# Patient Record
Sex: MALE | URBAN - METROPOLITAN AREA
[De-identification: names, ages, dates, MRNs, and addresses within clinical notes are randomized per-mention and may not be internally consistent; named-entity substitution may affect disease eponyms.]

---

## 2022-11-03 ENCOUNTER — APPOINTMENT (OUTPATIENT)
Dept: URBAN - METROPOLITAN AREA CLINIC 259 | Age: 57
Setting detail: DERMATOLOGY
End: 2022-11-08

## 2022-11-03 DIAGNOSIS — D22 MELANOCYTIC NEVI: ICD-10-CM

## 2022-11-03 DIAGNOSIS — L81.4 OTHER MELANIN HYPERPIGMENTATION: ICD-10-CM

## 2022-11-03 DIAGNOSIS — D18.0 HEMANGIOMA: ICD-10-CM

## 2022-11-03 DIAGNOSIS — L57.8 OTHER SKIN CHANGES DUE TO CHRONIC EXPOSURE TO NONIONIZING RADIATION: ICD-10-CM

## 2022-11-03 DIAGNOSIS — L20.89 OTHER ATOPIC DERMATITIS: ICD-10-CM

## 2022-11-03 DIAGNOSIS — L21.8 OTHER SEBORRHEIC DERMATITIS: ICD-10-CM

## 2022-11-03 DIAGNOSIS — L82.1 OTHER SEBORRHEIC KERATOSIS: ICD-10-CM

## 2022-11-03 DIAGNOSIS — Z71.89 OTHER SPECIFIED COUNSELING: ICD-10-CM

## 2022-11-03 DIAGNOSIS — L57.0 ACTINIC KERATOSIS: ICD-10-CM

## 2022-11-03 PROBLEM — D18.01 HEMANGIOMA OF SKIN AND SUBCUTANEOUS TISSUE: Status: ACTIVE | Noted: 2022-11-03

## 2022-11-03 PROBLEM — D22.5 MELANOCYTIC NEVI OF TRUNK: Status: ACTIVE | Noted: 2022-11-03

## 2022-11-03 PROBLEM — L20.84 INTRINSIC (ALLERGIC) ECZEMA: Status: ACTIVE | Noted: 2022-11-03

## 2022-11-03 PROCEDURE — OTHER PRESCRIPTION: OTHER

## 2022-11-03 PROCEDURE — OTHER LIQUID NITROGEN: OTHER

## 2022-11-03 PROCEDURE — 99203 OFFICE O/P NEW LOW 30 MIN: CPT | Mod: 25

## 2022-11-03 PROCEDURE — 17003 DESTRUCT PREMALG LES 2-14: CPT

## 2022-11-03 PROCEDURE — 17000 DESTRUCT PREMALG LESION: CPT

## 2022-11-03 PROCEDURE — OTHER MIPS QUALITY: OTHER

## 2022-11-03 PROCEDURE — OTHER COUNSELING: OTHER

## 2022-11-03 RX ORDER — FLUOCINONIDE 0.5 MG/G
CREAM TOPICAL
Qty: 1 | Refills: 3 | Status: ERX | COMMUNITY
Start: 2022-11-03

## 2022-11-03 RX ORDER — HYDROCORTISONE 25 MG/G
OINTMENT TOPICAL
Qty: 1 | Refills: 3 | Status: ERX | COMMUNITY
Start: 2022-11-03

## 2022-11-03 ASSESSMENT — LOCATION ZONE DERM
LOCATION ZONE: SCALP
LOCATION ZONE: EYELID
LOCATION ZONE: TRUNK

## 2022-11-03 ASSESSMENT — LOCATION SIMPLE DESCRIPTION DERM
LOCATION SIMPLE: RIGHT SUPERIOR EYELID
LOCATION SIMPLE: UPPER BACK
LOCATION SIMPLE: ANTERIOR SCALP
LOCATION SIMPLE: LEFT SUPERIOR EYELID
LOCATION SIMPLE: LEFT UPPER BACK
LOCATION SIMPLE: RIGHT UPPER BACK

## 2022-11-03 ASSESSMENT — LOCATION DETAILED DESCRIPTION DERM
LOCATION DETAILED: INFERIOR THORACIC SPINE
LOCATION DETAILED: LEFT INFERIOR MEDIAL UPPER BACK
LOCATION DETAILED: LEFT LATERAL SUPERIOR EYELID
LOCATION DETAILED: RIGHT LATERAL SUPERIOR EYELID
LOCATION DETAILED: RIGHT SUPERIOR MEDIAL UPPER BACK
LOCATION DETAILED: MID-FRONTAL SCALP
LOCATION DETAILED: LEFT MEDIAL UPPER BACK

## 2022-11-03 NOTE — PROCEDURE: MIPS QUALITY

## 2022-11-03 NOTE — HPI: FULL BODY SKIN EXAMINATION
How Severe Are Your Spot(S)?: moderate
What Type Of Note Output Would You Prefer (Optional)?: Standard Output
What Is The Reason For Today's Visit?: Full Body Skin Examination
What Is The Reason For Today's Visit? (Being Monitored For X): concerning skin lesions on an annual basis
Additional History: Patient has multiple concerns today. He states he gets itchiness on his right eyelid and legs, and wants his scalp looked at too, as the areas are bothersome. Otherwise, he is here for his full body skin examination.

## 2024-06-06 ENCOUNTER — TRANSCRIBE ORDERS (OUTPATIENT)
Dept: OTHER | Age: 59
End: 2024-06-06

## 2024-06-06 DIAGNOSIS — M25.561 RIGHT KNEE PAIN, UNSPECIFIED CHRONICITY: Primary | ICD-10-CM

## 2024-06-27 ENCOUNTER — THERAPY VISIT (OUTPATIENT)
Dept: PHYSICAL THERAPY | Facility: CLINIC | Age: 59
End: 2024-06-27
Attending: FAMILY MEDICINE
Payer: COMMERCIAL

## 2024-06-27 DIAGNOSIS — M25.561 RIGHT KNEE PAIN, UNSPECIFIED CHRONICITY: ICD-10-CM

## 2024-06-27 PROCEDURE — 97110 THERAPEUTIC EXERCISES: CPT | Mod: GP | Performed by: PHYSICAL THERAPIST

## 2024-06-27 PROCEDURE — 97161 PT EVAL LOW COMPLEX 20 MIN: CPT | Mod: GP | Performed by: PHYSICAL THERAPIST

## 2024-06-27 ASSESSMENT — ACTIVITIES OF DAILY LIVING (ADL)
PLEASE_INDICATE_YOR_PRIMARY_REASON_FOR_REFERRAL_TO_THERAPY:: KNEE
SQUAT: ACTIVITY IS MINIMALLY DIFFICULT
PAIN: THE SYMPTOM AFFECTS MY ACTIVITY MODERATELY
WEAKNESS: I DO NOT HAVE THE SYMPTOM
KNEEL ON THE FRONT OF YOUR KNEE: ACTIVITY IS NOT DIFFICULT
WEAKNESS: I DO NOT HAVE THE SYMPTOM
PAIN: THE SYMPTOM AFFECTS MY ACTIVITY MODERATELY
LIMPING: I DO NOT HAVE THE SYMPTOM
AS_A_RESULT_OF_YOUR_KNEE_INJURY,_HOW_WOULD_YOU_RATE_YOUR_CURRENT_LEVEL_OF_DAILY_ACTIVITY?: NEARLY NORMAL
WALK: ACTIVITY IS MINIMALLY DIFFICULT
GIVING WAY, BUCKLING OR SHIFTING OF KNEE: I DO NOT HAVE THE SYMPTOM
WALK: ACTIVITY IS MINIMALLY DIFFICULT
STAND: ACTIVITY IS NOT DIFFICULT
GO DOWN STAIRS: ACTIVITY IS MINIMALLY DIFFICULT
HOW_WOULD_YOU_RATE_THE_OVERALL_FUNCTION_OF_YOUR_KNEE_DURING_YOUR_USUAL_DAILY_ACTIVITIES?: NEARLY NORMAL
STIFFNESS: THE SYMPTOM AFFECTS MY ACTIVITY MODERATELY
LIMPING: I DO NOT HAVE THE SYMPTOM
SQUAT: ACTIVITY IS MINIMALLY DIFFICULT
KNEE_ACTIVITY_OF_DAILY_LIVING_SCORE: 81.43
SWELLING: I HAVE THE SYMPTOM BUT IT DOES NOT AFFECT MY ACTIVITY
GO UP STAIRS: ACTIVITY IS SOMEWHAT DIFFICULT
STIFFNESS: THE SYMPTOM AFFECTS MY ACTIVITY MODERATELY
STAND: ACTIVITY IS NOT DIFFICULT
SWELLING: I HAVE THE SYMPTOM BUT IT DOES NOT AFFECT MY ACTIVITY
RAW_SCORE: 57
SIT WITH YOUR KNEE BENT: ACTIVITY IS NOT DIFFICULT
GO UP STAIRS: ACTIVITY IS SOMEWHAT DIFFICULT
HOW_WOULD_YOU_RATE_THE_OVERALL_FUNCTION_OF_YOUR_KNEE_DURING_YOUR_USUAL_DAILY_ACTIVITIES?: NEARLY NORMAL
GO DOWN STAIRS: ACTIVITY IS MINIMALLY DIFFICULT
RISE FROM A CHAIR: ACTIVITY IS MINIMALLY DIFFICULT
SIT WITH YOUR KNEE BENT: ACTIVITY IS NOT DIFFICULT
GIVING WAY, BUCKLING OR SHIFTING OF KNEE: I DO NOT HAVE THE SYMPTOM
AS_A_RESULT_OF_YOUR_KNEE_INJURY,_HOW_WOULD_YOU_RATE_YOUR_CURRENT_LEVEL_OF_DAILY_ACTIVITY?: NEARLY NORMAL
RISE FROM A CHAIR: ACTIVITY IS MINIMALLY DIFFICULT
KNEEL ON THE FRONT OF YOUR KNEE: ACTIVITY IS NOT DIFFICULT
KNEE_ACTIVITY_OF_DAILY_LIVING_SUM: 57

## 2024-06-27 NOTE — PROGRESS NOTES
"PHYSICAL THERAPY EVALUATION  Type of Visit: Evaluation       Fall Risk Screen:  Fall screen completed by: PT  Have you fallen 2 or more times in the past year?: No  Have you fallen and had an injury in the past year?: No  Is patient a fall risk?: No    Subjective       Presenting condition or subjective complaint: knee pain behind kneecap  and hip pain and shoulderPt has hx of R knee reconstruction in 1986 due to ACL/PCL and meniscus tears playing football. Had another scope around 15 years and it had been \"decent.\" More recently about six weeks ago (May 2024) noticed with biking pain behind the R knee cap. Saw MD and referred to PT.     Date of onset:      Relevant medical history: Arthritis; Osteoarthritis; Sleep disorder like apnea   Dates & types of surgery: knee reconstruction 1986, L achilles reconstruction around 2004    Prior diagnostic imaging/testing results: MRI; X-ray     Prior therapy history for the same diagnosis, illness or injury: Yes      Prior Level of Function  Transfers:   Ambulation:   ADL:   IADL:     Living Environment  Social support: With a significant other or spouse   Type of home: House   Stairs to enter the home:         Ramp: No   Stairs inside the home: Yes 20 Is there a railing: Yes     Help at home: None  Equipment owned:       Employment: Yes academic administration  Hobbies/Interests: walking biking    Patient goals for therapy: be able to exercise more    Pain assessment: See objective evaluation for additional pain details     Objective   KNEE EVALUATION  PAIN: Pain Level at Rest: 0/10  Pain Level with Use: 7/10  Pain Location: mostly under patella/anterior, medial  Pain Quality: Aching, Sharp, and (achey after a lot of activity)  Pain Frequency: intermittent  Pain is Worst: activity related  Pain is Exacerbated By: going down hill, hiking, just using it a lot  Pain is Relieved By: rest  Pain Progression: Improved  INTEGUMENTARY (edema, incisions):   POSTURE: "   GAIT:  Weightbearing Status:   Assistive Device(s):   Gait Deviations:   BALANCE/PROPRIOCEPTION:   WEIGHTBEARING ALIGNMENT:   NON-WEIGHTBEARING ALIGNMENT:   ROM:   (Degrees) Left AROM Left PROM  Right AROM Right PROM   Knee Flexion 135  134    Knee Extension 15 hyper  0    Pain:   End feel:     STRENGTH: WNL  FLEXIBILITY:   SPECIAL TESTS:   FUNCTIONAL TESTS:   PALPATION:   JOINT MOBILITY:     Assessment & Plan   CLINICAL IMPRESSIONS  Medical Diagnosis: Right knee pain, unspecified chronicity    Treatment Diagnosis:     Impression/Assessment: Patient is a 59 year old male with R knee complaints.  The following significant findings have been identified: Pain, Decreased ROM/flexibility, Decreased joint mobility, Inflammation, and Decreased activity tolerance. These impairments interfere with their ability to perform recreational activities, household mobility, and community mobility as compared to previous level of function.     Clinical Decision Making (Complexity):  Clinical Presentation: Evolving/Changing  Clinical Presentation Rationale: based on medical and personal factors listed in PT evaluation  Clinical Decision Making (Complexity): Low complexity    PLAN OF CARE  Treatment Interventions:  Interventions: Manual Therapy, Neuromuscular Re-education, Therapeutic Activity, Therapeutic Exercise    Long Term Goals     PT Goal 1  Goal Identifier: ambulation  Goal Description: Pt will be able to walk one hour without increased R knee pain  Rationale: to maximize safety and independence within the home;to maximize safety and independence within the community;to maximize safety and independence with self cares  Target Date: 08/22/24      Frequency of Treatment: 1x/wk  Duration of Treatment: 8 wks    Recommended Referrals to Other Professionals:   Education Assessment:        Risks and benefits of evaluation/treatment have been explained.   Patient/Family/caregiver agrees with Plan of Care.     Evaluation Time:     PT  Eval, Low Complexity Minutes (86715): 24       Signing Clinician: Suhail Sainz, PT

## 2024-07-03 ENCOUNTER — THERAPY VISIT (OUTPATIENT)
Dept: PHYSICAL THERAPY | Facility: CLINIC | Age: 59
End: 2024-07-03
Payer: COMMERCIAL

## 2024-07-03 DIAGNOSIS — M25.561 RIGHT KNEE PAIN, UNSPECIFIED CHRONICITY: Primary | ICD-10-CM

## 2024-07-03 PROCEDURE — 97110 THERAPEUTIC EXERCISES: CPT | Mod: GP | Performed by: PHYSICAL THERAPIST

## 2024-07-03 PROCEDURE — 97140 MANUAL THERAPY 1/> REGIONS: CPT | Mod: GP | Performed by: PHYSICAL THERAPIST

## 2024-08-06 PROBLEM — M25.561 RIGHT KNEE PAIN, UNSPECIFIED CHRONICITY: Status: RESOLVED | Noted: 2024-06-27 | Resolved: 2024-08-06

## 2025-07-12 ENCOUNTER — HEALTH MAINTENANCE LETTER (OUTPATIENT)
Age: 60
End: 2025-07-12